# Patient Record
Sex: MALE | Race: WHITE
[De-identification: names, ages, dates, MRNs, and addresses within clinical notes are randomized per-mention and may not be internally consistent; named-entity substitution may affect disease eponyms.]

---

## 2020-02-12 ENCOUNTER — HOSPITAL ENCOUNTER (OUTPATIENT)
Dept: HOSPITAL 25 - OR | Age: 60
Discharge: HOME | End: 2020-02-12
Attending: UROLOGY
Payer: COMMERCIAL

## 2020-02-12 VITALS — SYSTOLIC BLOOD PRESSURE: 151 MMHG | DIASTOLIC BLOOD PRESSURE: 96 MMHG

## 2020-02-12 DIAGNOSIS — F17.290: ICD-10-CM

## 2020-02-12 DIAGNOSIS — N13.2: Primary | ICD-10-CM

## 2020-02-12 DIAGNOSIS — I10: ICD-10-CM

## 2020-02-12 DIAGNOSIS — E78.00: ICD-10-CM

## 2020-02-12 PROCEDURE — 88300 SURGICAL PATH GROSS: CPT

## 2020-02-12 PROCEDURE — C1876 STENT, NON-COA/NON-COV W/DEL: HCPCS

## 2020-02-12 PROCEDURE — 74420 UROGRAPHY RTRGR +-KUB: CPT

## 2020-02-12 PROCEDURE — 82365 CALCULUS SPECTROSCOPY: CPT

## 2020-02-12 NOTE — HP
CC:  Dr. Hamm

 

DATE OF ADMISSION:  02/12/2020.

 

ADMITTING DIAGNOSES:

1.  Right ureteral calculi.

2.  Right hydronephrosis.

 

PLANNED PROCEDURE:  Right ureteroscopy, possible laser and stent insertion.

 

SURGEON:  Dr. Alston.

 

HISTORY OF PRESENT ILLNESS:  Fredrick Chicas is a 59-year-old gentleman who has had episodic right flank
 pain for the last three weeks or so.  He was noted to have what appeared to be two calculi in the ri
t distal ureter.  One measuring about 5.2 mm and the other one measuring 3.7 mm with mild right hyd
ronephrosis and evidence of perinephric extravasation.  He has been managed conservatively for the la
st few weeks and has been on Flomax with persistent calculi and is now being brought in for right ure
teroscopy.

 

PAST MEDICAL HISTORY:  Significant for:

1.  Hypertension.

2.  High cholesterol.

 

PAST SURGICAL HISTORY:  Significant for appendectomy.

 

MEDICATIONS ON ADMISSION:

1.  Flomax 0.4 mg once a day.

2.  Amlodipine 5 mg daily.

3.  Pantoprazole 40 mg daily.

4.  Simvastatin 20 mg daily.

 

ALLERGIES:  No known drug allergies (INTOLERANT OF LISINOPRIL).

 

FAMILY HISTORY:  Negative for stones.

 

SMOKING HISTORY:  He has a 30 to 40 pack year smoking history and now smokes cigars.

 

REVIEW OF SYSTEMS:  He is otherwise in excellent health.  There is no history of diabetes mellitus or
 any other major systemic illness.  He denies any chest pain or shortness of breath.

 

                               PHYSICAL EXAMINATION

 

GENERAL:  Pleasant, healthy-appearing, middle-aged gentleman.

 

VITAL SIGNS:  Blood pressure 118/70, pulse 70 per minute and regular, temperature 98, oxygen saturati
on 98 percent on room air

 

CARDIOVASCULAR:  Regular rate and rhythm.  S1, S2.

 

LUNGS:  Clear bilaterally.

 

ABDOMEN:  Soft with mild right flank tenderness.

 

 IMPRESSION/PLAN:  [age]-year-old gentleman with persistent with distal ureteral calculi who is being
 brought in for right ureteroscopy, possible laser and stent insertion.

 

 

 

[id number]/[facility] #: [id number]

## 2020-02-13 NOTE — OP
CC:  Dr. Tee Hamm; Dr. Gael Alston

 

OPERATIVE SUMMARY:

 

DATE OF OPERATION:  02/12/20

 

DATE OF BIRTH:  [birth year]

 

SURGEON:  Dr. Alston.

 

ANESTHESIOLOGIST:  Dr. Rowan.

 

ANESTHESIA:  General.

 

PRE-OP DIAGNOSIS:  Right ureteral calculi.

 

POST-OP DIAGNOSES:

1.  Right ureteral calculi.

2.  Right hydronephrosis.

 

OPERATIVE PROCEDURE:  Cystoscopy, right retrograde pyelogram, right ureteroscopy, and laser lithotrip
sy of ureteral calculi and removal of calculus fragments, and right stent insertion.

 

COMPLICATIONS:  None.

 

STENT USED:  7-Omani stent, right ureter.

 

INDICATIONS:  Fredrick Chicas is a 59-year-old gentleman, who has had persistent right ureteral calculi 
and is now being brought in for treatment of the same.

 

OPERATIVE FINDINGS:

1.  Urethral strictures.

2.  Two calculi right distal ureter (approximately 8 and 3 mm) with right hydronephrosis.

 

POSTOPERATIVE CONDITION:  Stable.

 

DESCRIPTION OF PROCEDURE:  After induction of general anesthesia, the patient was placed in dorsal li
thotomy position.  Sequential compression devices were in place and functioning.  Initial cystoscopy 
revealed 2 strictures in the bulbar urethra and a mildly enlarged prostate.  The bladder was examined
 and appeared unremarkable.  A guidewire was introduced into the right ureter.  Retrograde pyelogram 
revealed fullness of the right collecting system.  A 6-Omani semi-rigid ureteroscope was introduced 
and advanced under direct vision.  In the distal ureter, 2 calculi were noted, one was fairly enlarge
d about 8 to 9 mm in size and a smaller one about 3 mm.  Using a 550-micron holmium laser, these were
 successfully broken up into multiple small fragments, which were then removed and sent for analysis.
  At the end of the procedure, there were no remaining fragments and a 7- Omani stent was introduced
 and positioned under fluoroscopy with good proximal and distal positioning obtained.  The patient to
lerated the procedure satisfactorily and was transferred back to the recovery area in stable conditio
n.

 

 [id number]/[facility] #: [id number]